# Patient Record
Sex: FEMALE | Race: WHITE | NOT HISPANIC OR LATINO | URBAN - METROPOLITAN AREA
[De-identification: names, ages, dates, MRNs, and addresses within clinical notes are randomized per-mention and may not be internally consistent; named-entity substitution may affect disease eponyms.]

---

## 2017-10-19 ENCOUNTER — IMPORTED ENCOUNTER (OUTPATIENT)
Dept: URBAN - METROPOLITAN AREA CLINIC 1 | Facility: CLINIC | Age: 57
End: 2017-10-19

## 2017-10-19 PROBLEM — H25.813: Noted: 2017-10-19

## 2017-10-19 PROBLEM — H04.123: Noted: 2017-10-19

## 2017-10-19 PROBLEM — H16.143: Noted: 2017-10-19

## 2017-10-19 PROCEDURE — 92014 COMPRE OPH EXAM EST PT 1/>: CPT

## 2017-10-19 PROCEDURE — 92015 DETERMINE REFRACTIVE STATE: CPT

## 2017-10-19 NOTE — PATIENT DISCUSSION
1.  Cataract OU: Observe for now without intervention. The patient was advised to contact us if any change or worsening of vision2. ABRAHAM w/ PEK OU- No improvement w/ PRN use of Restasis. Recommend pt use Restasis OU BID routinely. The increase of artificial tears OU to TID were recommended. Continue Celluvisc OU QHS. Plug intact LLL. 3.  Return for an appointment for a 10/check K on Restasis with Dr. Brady Ramirez.

## 2018-10-23 ENCOUNTER — IMPORTED ENCOUNTER (OUTPATIENT)
Dept: URBAN - METROPOLITAN AREA CLINIC 1 | Facility: CLINIC | Age: 58
End: 2018-10-23

## 2018-10-23 PROBLEM — H04.123: Noted: 2018-10-23

## 2018-10-23 PROBLEM — H16.143: Noted: 2018-10-23

## 2018-10-23 PROBLEM — H25.813: Noted: 2018-10-23

## 2018-10-23 PROBLEM — H43.812: Noted: 2018-10-23

## 2018-10-23 PROCEDURE — 92014 COMPRE OPH EXAM EST PT 1/>: CPT

## 2018-10-23 NOTE — PATIENT DISCUSSION
1.  ABRAHAM w/ PEK OU- Insurance didn't cover Restasis. Stat Xiidra OU BID. The continuation of artificial tears OU TID were recommended routinely. Plug intact LLL. Continue Refresh Celluvisc OU QHS. 2.  Cataract OU: Observe for now without intervention. The patient was advised to contact us if any change or worsening of vision3. PVD w/o Tear OS- Old stable. 4. Return for an appointment for 10/check K in 6 months with Dr. Marguerite Almeida.

## 2019-04-23 ENCOUNTER — IMPORTED ENCOUNTER (OUTPATIENT)
Dept: URBAN - METROPOLITAN AREA CLINIC 1 | Facility: CLINIC | Age: 59
End: 2019-04-23

## 2019-04-23 PROBLEM — H16.143: Noted: 2019-04-23

## 2019-04-23 PROBLEM — H04.123: Noted: 2019-04-23

## 2019-04-23 PROCEDURE — 92012 INTRM OPH EXAM EST PATIENT: CPT

## 2019-04-23 NOTE — PATIENT DISCUSSION
1.  ABRAHAM w/ PEK OU (Puncta open RLL Plug Intact LLL) -- Insurance didn't cover Restasis previously but now has failed trial of Ramona Friend. Begin Restasis BID OU (ERx'd). Recommend increase the frequent use of OTC AT's QID OU Routinely. Recommend continue the use of OTC Refresh Celluvisc QHS OU Routinely. 2.  Cataracts OU -- Observe for now without intervention. The patient was advised to contact us if any change or worsening of vision. 3. H/o PVD w/o Tear OS Return for an appointment in 6 MO for a 30 OU with Dr. Nandini Gaffney.

## 2019-11-05 ENCOUNTER — IMPORTED ENCOUNTER (OUTPATIENT)
Dept: URBAN - METROPOLITAN AREA CLINIC 1 | Facility: CLINIC | Age: 59
End: 2019-11-05

## 2019-11-05 PROBLEM — H04.123: Noted: 2019-11-05

## 2019-11-05 PROBLEM — H25.813: Noted: 2019-11-05

## 2019-11-05 PROBLEM — H16.143: Noted: 2019-11-05

## 2019-11-05 PROBLEM — H43.812: Noted: 2019-11-05

## 2019-11-05 PROCEDURE — 92014 COMPRE OPH EXAM EST PT 1/>: CPT

## 2019-11-05 NOTE — PATIENT DISCUSSION
1.  ABRAHAM w/ increased PEK OU (Puncta open RLL Plug Intact LLL) Patient non-compliant with Restasis. *Insurance didn't cover Restasis previously but now has failed trial of [place]. Cont Restasis BID OU and use Routinely. Cont ATs QID OU Routinely Cont Refresh Celluvisc QHS OU. 2.  Cataract OU: Observe for now without intervention. The patient was advised to contact us if any change or worsening of vision3. PVD w/o Tear OS- RD precautions. Letter to PCP MRx deferred Return for an appointment in 6 mo 10 with Dr. Jacquelin Haddad.

## 2020-11-02 ENCOUNTER — IMPORTED ENCOUNTER (OUTPATIENT)
Dept: URBAN - METROPOLITAN AREA CLINIC 1 | Facility: CLINIC | Age: 60
End: 2020-11-02

## 2020-11-02 PROBLEM — H16.143: Noted: 2020-11-02

## 2020-11-02 PROBLEM — H04.123: Noted: 2020-11-02

## 2020-11-02 PROBLEM — H25.813: Noted: 2020-11-02

## 2020-11-02 PROBLEM — H43.812: Noted: 2020-11-02

## 2020-11-02 PROCEDURE — 92014 COMPRE OPH EXAM EST PT 1/>: CPT

## 2020-11-02 NOTE — PATIENT DISCUSSION
1.  ABRAHAM w/ PEK OU- (LLL plug in place RLL punctum open)  Noncompliant with Restasis. Advised Restasis BID OU and ATs QID OU routinely. *H/o Failed trial of Xiidra 2. Cataract OU: Observe for now without intervention. The patient was advised to contact us if any change or worsening of vision3. PVD w/o Tear OS- RD precautions. Patient deferred [facility] Rx today. Return for an appointment in 6 months 10 with Dr. Louisa Small.

## 2021-02-09 NOTE — PROCEDURE NOTE: CLINICAL
PROCEDURE NOTE: Avastin 1.25mg #1 OS. Diagnosis: Diabetic Macular Edema. Anesthesia: Lidocaine 4%. Prep: Betadine Drops. Prior to injection, risks/benefits/alternatives discussed including infection, loss of vision, hemorrhage, cataract, glaucoma, retinal tears or detachment. The off-label status of Intravitreal Avastin also was reviewed. The patient wished to proceed with treatment. Topical anesthesia was induced with Alcaine. Additional anesthesia was achieved using drop(s) or injection checked above. A drop of Povidone-iodine 5% ophthalmic solution was instilled over the injection site and in the inferior fornix. Betadine prep was performed. Using the syringe provided, Avastin 1.25 mg in 0.05 cc was injected into the vitreous cavity. The needle was passed 3.0 mm posterior to the limbus in pseudophakic patients, and 3.5 mm posterior to the limbus in phakic patients. Patient tolerated procedure well. There were no complications. Injection time: 11:37. Lot #: [email or web address]. Expiration Date: 4/6/2021. Post-op instructions given. Inventory Id: null. The patient was instructed to return for re-evaluation in approximately 4-12 weeks depending on his/her condition and was told to call immediately if vision decreases and/or if his/her eye becomes red, painful, and/or light sensitive. The patient was instructed to go to the emergency room or call 911 if unable to reach the doctor within an hour or two of trying or calling. The patient was instructed to use Artificial Tears q.i.d. p.r.n for comfort. Huy Gotti

## 2021-05-18 ENCOUNTER — IMPORTED ENCOUNTER (OUTPATIENT)
Dept: URBAN - METROPOLITAN AREA CLINIC 1 | Facility: CLINIC | Age: 61
End: 2021-05-18

## 2021-05-18 PROBLEM — H04.123: Noted: 2021-05-18

## 2021-05-18 PROBLEM — H16.143: Noted: 2021-05-18

## 2021-05-18 PROCEDURE — 92012 INTRM OPH EXAM EST PATIENT: CPT

## 2021-05-18 NOTE — PATIENT DISCUSSION
1.  ABRAHAM w/ PEK OU -- (LLL plug in place RLL punctum open) PEK improved OU w/ compliance. Cont Restasis BID OU and ATs QID OU routinely. *H/o Failed trial of Xiidra 2. Cataract OU -- Observe for now without intervention. The patient was advised to contact us if any change or worsening of vision3. PVD w/o Tear OS -- RD precautions Return for an appointment in 6 months for a 30/MRX/glare  (or patient may return for a refract only sooner) with Dr. Serge Jacobsen.

## 2021-09-08 ENCOUNTER — IMPORTED ENCOUNTER (OUTPATIENT)
Dept: URBAN - METROPOLITAN AREA CLINIC 1 | Facility: CLINIC | Age: 61
End: 2021-09-08

## 2021-09-08 PROBLEM — H52.4: Noted: 2021-09-08

## 2021-09-08 PROBLEM — H52.03: Noted: 2021-09-08

## 2021-09-08 PROCEDURE — S0621 ROUTINE OPHTHALMOLOGICAL EXA: HCPCS

## 2021-09-08 NOTE — PATIENT DISCUSSION
1.  Hyperopia: Rx was given for corrective spectacles if indicated. 2.  Presbyopia 3. ABRAHAM w/ PEK OU - (LLL plug in place RLL punctum open)  Cont Restasis BID OU and ATs QID OU routinely. *H/o Failed trial of Xiidra 4. Cataract OU - Observe 5. PVD w/o Tear OSReturn for an appointment in 1 year 36 with Dr. Olivia Hernandez. Return for an appointment for Return as scheduled with Dr. Stiven Lopez.

## 2021-09-24 NOTE — PROCEDURE NOTE: CLINICAL
PROCEDURE NOTE: Avastin 1.25mg OS. Diagnosis: Proliferative Diabetic Retinopathy. Anesthesia: Subconjunctival. Prep: Betadine Drops. Prior to injection, risks/benefits/alternatives discussed including infection, loss of vision, hemorrhage, cataract, glaucoma, retinal tears or detachment. The off-label status of Intravitreal Avastin also was reviewed. The patient wished to proceed with treatment. Topical anesthesia was induced with Alcaine. Additional anesthesia was achieved using drop(s) or injection checked above. A drop of Povidone-iodine 5% ophthalmic solution was instilled over the injection site and in the inferior fornix. Betadine prep was performed. Using the syringe provided, Avastin 1.25 mg in 0.05 cc was injected into the vitreous cavity. The needle was passed 3.0 mm posterior to the limbus in pseudophakic patients, and 3.5 mm posterior to the limbus in phakic patients. Patient tolerated procedure well. There were no complications. Injection time: *. Lot #: [id number]. Expiration Date: 10/29/2021. Post-op instructions given. Inventory Id: null. The patient was instructed to return for re-evaluation in approximately 4-12 weeks depending on his/her condition and was told to call immediately if vision decreases and/or if his/her eye becomes red, painful, and/or light sensitive. The patient was instructed to go to the emergency room or call 911 if unable to reach the doctor within an hour or two of trying or calling. The patient was instructed to use Artificial Tears q.i.d. p.r.n for comfort. Maria Del Carmen Mclaughlin

## 2022-02-08 ENCOUNTER — COMPREHENSIVE EXAM (OUTPATIENT)
Dept: URBAN - METROPOLITAN AREA CLINIC 1 | Facility: CLINIC | Age: 62
End: 2022-02-08

## 2022-02-08 DIAGNOSIS — H43.812: ICD-10-CM

## 2022-02-08 DIAGNOSIS — H25.813: ICD-10-CM

## 2022-02-08 DIAGNOSIS — H04.123: ICD-10-CM

## 2022-02-08 PROCEDURE — 92014 COMPRE OPH EXAM EST PT 1/>: CPT

## 2022-02-08 ASSESSMENT — VISUAL ACUITY
OD_CC: 20/20
OD_CC: J1+
OS_CC: J1+
OS_SC: J10
OS_SC: 20/30
OD_BAT: 20/50
OS_BAT: 20/50
OS_CC: 20/20
OD_SC: 20/30
OD_SC: J10

## 2022-02-08 ASSESSMENT — TONOMETRY
OD_IOP_MMHG: 15
OS_IOP_MMHG: 15

## 2022-04-02 ASSESSMENT — TONOMETRY
OS_IOP_MMHG: 14
OD_IOP_MMHG: 15
OD_IOP_MMHG: 16
OS_IOP_MMHG: 15
OS_IOP_MMHG: 16
OD_IOP_MMHG: 14
OS_IOP_MMHG: 15
OD_IOP_MMHG: 15
OS_IOP_MMHG: 14
OD_IOP_MMHG: 15
OS_IOP_MMHG: 15
OD_IOP_MMHG: 16
OD_IOP_MMHG: 14
OS_IOP_MMHG: 15

## 2022-04-02 ASSESSMENT — VISUAL ACUITY
OS_CC: J1+
OS_CC: J2
OS_GLARE: 20/60
OD_SC: 20/20
OS_CC: 20/30
OD_GLARE: 20/50
OS_SC: 20/20
OD_CC: 20/50
OD_SC: 20/20
OD_CC: J1+
OD_SC: 20/20-1
OD_CC: J2
OS_SC: 20/20
OS_SC: 20/20
OD_SC: 20/20
OS_CC: 20/50-1
OS_SC: 20/20
OS_SC: 20/20
OD_CC: 20/40
OS_SC: 20/20

## 2022-08-16 ENCOUNTER — FOLLOW UP (OUTPATIENT)
Dept: URBAN - METROPOLITAN AREA CLINIC 1 | Facility: CLINIC | Age: 62
End: 2022-08-16

## 2022-08-16 DIAGNOSIS — H16.142: ICD-10-CM

## 2022-08-16 DIAGNOSIS — H04.123: ICD-10-CM

## 2022-08-16 PROCEDURE — 99213 OFFICE O/P EST LOW 20 MIN: CPT

## 2022-08-16 ASSESSMENT — VISUAL ACUITY
OS_CC: 20/20
OD_CC: 20/20

## 2022-08-16 ASSESSMENT — TONOMETRY
OD_IOP_MMHG: 18
OS_IOP_MMHG: 18

## 2023-08-25 ENCOUNTER — FOLLOW UP (OUTPATIENT)
Dept: URBAN - METROPOLITAN AREA CLINIC 1 | Facility: CLINIC | Age: 63
End: 2023-08-25

## 2023-08-25 DIAGNOSIS — H16.142: ICD-10-CM

## 2023-08-25 DIAGNOSIS — H04.123: ICD-10-CM

## 2023-08-25 PROCEDURE — 92012 INTRM OPH EXAM EST PATIENT: CPT

## 2023-08-25 ASSESSMENT — VISUAL ACUITY
OD_CC: 20/20-1
OS_CC: 20/20-1
OU_CC: J1+

## 2023-08-25 ASSESSMENT — TONOMETRY
OD_IOP_MMHG: 18
OS_IOP_MMHG: 18

## 2024-03-01 ENCOUNTER — COMPREHENSIVE EXAM (OUTPATIENT)
Dept: URBAN - METROPOLITAN AREA CLINIC 1 | Facility: CLINIC | Age: 64
End: 2024-03-01

## 2024-03-01 DIAGNOSIS — H16.142: ICD-10-CM

## 2024-03-01 DIAGNOSIS — H04.123: ICD-10-CM

## 2024-03-01 DIAGNOSIS — H43.812: ICD-10-CM

## 2024-03-01 DIAGNOSIS — H25.813: ICD-10-CM

## 2024-03-01 PROCEDURE — 92014 COMPRE OPH EXAM EST PT 1/>: CPT

## 2024-03-01 RX ORDER — CYCLOSPORINE 0 MG/ML: 1 SOLUTION/ DROPS OPHTHALMIC; TOPICAL TWICE A DAY

## 2024-03-01 ASSESSMENT — TONOMETRY
OS_IOP_MMHG: 18
OD_IOP_MMHG: 18

## 2024-03-01 ASSESSMENT — VISUAL ACUITY
OD_CC: 20/20
OD_BAT: 20/50
OS_CC: 20/20
OS_BAT: 20/50

## 2024-09-05 ENCOUNTER — FOLLOW UP (OUTPATIENT)
Dept: URBAN - METROPOLITAN AREA CLINIC 1 | Facility: CLINIC | Age: 64
End: 2024-09-05

## 2024-09-05 DIAGNOSIS — H04.123: ICD-10-CM

## 2024-09-05 DIAGNOSIS — H16.142: ICD-10-CM

## 2024-09-05 PROCEDURE — 92012 INTRM OPH EXAM EST PATIENT: CPT

## 2025-03-20 ENCOUNTER — COMPREHENSIVE EXAM (OUTPATIENT)
Age: 65
End: 2025-03-20

## 2025-03-20 DIAGNOSIS — H25.813: ICD-10-CM

## 2025-03-20 DIAGNOSIS — H04.123: ICD-10-CM

## 2025-03-20 PROCEDURE — 92015 DETERMINE REFRACTIVE STATE: CPT

## 2025-03-20 PROCEDURE — 92014 COMPRE OPH EXAM EST PT 1/>: CPT

## 2025-07-17 ENCOUNTER — FOLLOW UP (OUTPATIENT)
Age: 65
End: 2025-07-17

## 2025-07-17 DIAGNOSIS — H04.123: ICD-10-CM

## 2025-07-17 PROCEDURE — 92012 INTRM OPH EXAM EST PATIENT: CPT
